# Patient Record
Sex: MALE | Race: WHITE | Employment: FULL TIME | URBAN - METROPOLITAN AREA
[De-identification: names, ages, dates, MRNs, and addresses within clinical notes are randomized per-mention and may not be internally consistent; named-entity substitution may affect disease eponyms.]

---

## 2017-07-02 ENCOUNTER — HOSPITAL ENCOUNTER (OUTPATIENT)
Age: 37
Discharge: HOME | End: 2017-07-02
Payer: OTHER MISCELLANEOUS

## 2017-07-02 ENCOUNTER — APPOINTMENT (OUTPATIENT)
Dept: GENERAL RADIOLOGY | Age: 37
End: 2017-07-02
Attending: PHYSICIAN ASSISTANT
Payer: OTHER MISCELLANEOUS

## 2017-07-02 VITALS
SYSTOLIC BLOOD PRESSURE: 131 MMHG | RESPIRATION RATE: 18 BRPM | HEART RATE: 66 BPM | OXYGEN SATURATION: 98 % | DIASTOLIC BLOOD PRESSURE: 93 MMHG | TEMPERATURE: 98 F

## 2017-07-02 DIAGNOSIS — S46.911A SHOULDER STRAIN, RIGHT, INITIAL ENCOUNTER: Primary | ICD-10-CM

## 2017-07-02 PROCEDURE — 73030 X-RAY EXAM OF SHOULDER: CPT | Performed by: PHYSICIAN ASSISTANT

## 2017-07-02 PROCEDURE — 99204 OFFICE O/P NEW MOD 45 MIN: CPT

## 2017-07-02 PROCEDURE — 99213 OFFICE O/P EST LOW 20 MIN: CPT

## 2017-07-02 RX ORDER — IBUPROFEN 600 MG/1
600 TABLET ORAL ONCE
Status: COMPLETED | OUTPATIENT
Start: 2017-07-02 | End: 2017-07-02
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
## 2017-07-02 NOTE — ED PROVIDER NOTES
Patient Seen in: [facility]    History   Patient presents with:   Worker's Comp  Upper Extremity Injury (musculoskeletal)    Stated Complaint: arm injury- Work Comp    HPI    42 yo male  here for evaluation after he Comment: Procedure: CERVICAL EPIDURAL;  Surgeon:                Lia Shannon MD;  Location: [place] [address] MANAGEMENT  5/29/2012: Beulah OHARA & Gina Karimi NDL/CATH SPI DX/THER ZKV      Comment: Procedure: CERVICAL EPIDURAL;  Surgeon: 4/16/2012: INJECTION, W/WO CONTRAST, DX/THERAPEUTIC SUBST*      Comment: Procedure: CERVICAL EPIDURAL;  Surgeon:                Tata Veras MD;  Location: [place] [address] MANAGEMENT  5/8/2012: INJECTION, W/WO CONTRAST, DX/THERAPEUTIC Dalia Eagle MD;  Location: [place] [address] MANAGEMENT  6/27/2016: INJECTION, W/WO CONTRAST, DX/THERAPEUTIC SUBST* N/A      Comment: Procedure: LUMBAR EPIDURAL;  Surgeon:                Daila Eagle MD;  Location: [facility] Smoking status: Never Smoker                                                              Smokeless tobacco: Never Used                      Alcohol use: Yes           7.2 oz/week     Cans of beer: 12 per week     Comment: Social, weekly    The patient's m Result Date: 7/2/2017  PROCEDURE:  XR SHOULDER, COMPLETE (MIN 2 VIEWS), RIGHT (CPT=73030)     TECHNIQUE:  Multiple views were obtained. COMPARISON:  None.   INDICATIONS:  arm injury- Work Comp  PATIENT STATED HISTORY: (As transcribed by Technologist)  Wo Schedule an appointment as soon as possible for a visit  If symptoms worsen or with your own Ortho      Medications Prescribed:  Current Discharge Medication List

## 2017-07-31 PROBLEM — R79.89 ELEVATED TSH: Status: ACTIVE | Noted: 2017-07-31

## 2017-07-31 PROBLEM — E10.65 UNCONTROLLED TYPE 1 DIABETES MELLITUS WITH HYPERGLYCEMIA (HCC): Status: ACTIVE | Noted: 2017-07-31

## 2017-08-28 PROCEDURE — 82570 ASSAY OF URINE CREATININE: CPT | Performed by: INTERNAL MEDICINE

## 2017-08-28 PROCEDURE — 82043 UR ALBUMIN QUANTITATIVE: CPT | Performed by: INTERNAL MEDICINE

## 2017-08-28 PROCEDURE — 81003 URINALYSIS AUTO W/O SCOPE: CPT | Performed by: INTERNAL MEDICINE

## 2017-11-02 PROBLEM — M75.21 RIGHT BICIPITAL TENOSYNOVITIS: Status: ACTIVE | Noted: 2017-11-02

## 2017-11-07 PROBLEM — G89.29 CHRONIC LEFT SHOULDER PAIN: Status: ACTIVE | Noted: 2017-11-07

## 2017-11-07 PROBLEM — M25.512 CHRONIC LEFT SHOULDER PAIN: Status: ACTIVE | Noted: 2017-11-07

## 2017-12-05 PROCEDURE — 81003 URINALYSIS AUTO W/O SCOPE: CPT | Performed by: INTERNAL MEDICINE

## 2018-03-13 PROCEDURE — 81003 URINALYSIS AUTO W/O SCOPE: CPT | Performed by: INTERNAL MEDICINE

## 2018-04-02 PROBLEM — M47.22 OSTEOARTHRITIS OF SPINE WITH RADICULOPATHY, CERVICAL REGION: Status: ACTIVE | Noted: 2018-04-02

## 2018-04-02 PROBLEM — M48.02 CERVICAL STENOSIS OF SPINAL CANAL: Status: ACTIVE | Noted: 2018-04-02

## 2018-07-25 PROBLEM — G47.33 OSA (OBSTRUCTIVE SLEEP APNEA): Status: ACTIVE | Noted: 2018-07-25

## 2018-07-25 PROBLEM — G47.33 OSA (OBSTRUCTIVE SLEEP APNEA): Status: ACTIVE | Noted: 2018-02-16

## 2018-08-14 PROBLEM — M48.02 FORAMINAL STENOSIS OF CERVICAL REGION: Status: ACTIVE | Noted: 2018-08-14

## 2018-08-14 PROBLEM — Z98.1 HISTORY OF FUSION OF CERVICAL SPINE: Status: ACTIVE | Noted: 2018-08-14

## 2018-10-04 PROCEDURE — 87389 HIV-1 AG W/HIV-1&-2 AB AG IA: CPT | Performed by: INTERNAL MEDICINE

## 2018-10-04 PROCEDURE — 87491 CHLMYD TRACH DNA AMP PROBE: CPT | Performed by: INTERNAL MEDICINE

## 2018-10-04 PROCEDURE — 86706 HEP B SURFACE ANTIBODY: CPT | Performed by: INTERNAL MEDICINE

## 2018-10-04 PROCEDURE — 86780 TREPONEMA PALLIDUM: CPT | Performed by: INTERNAL MEDICINE

## 2018-10-04 PROCEDURE — 36415 COLL VENOUS BLD VENIPUNCTURE: CPT | Performed by: INTERNAL MEDICINE

## 2018-10-04 PROCEDURE — 87591 N.GONORRHOEAE DNA AMP PROB: CPT | Performed by: INTERNAL MEDICINE

## 2018-12-05 PROBLEM — M54.12 CERVICAL RADICULITIS: Status: ACTIVE | Noted: 2018-12-05

## 2019-02-07 PROCEDURE — 82043 UR ALBUMIN QUANTITATIVE: CPT | Performed by: INTERNAL MEDICINE

## 2019-02-07 PROCEDURE — 82570 ASSAY OF URINE CREATININE: CPT | Performed by: INTERNAL MEDICINE

## 2019-02-19 PROBLEM — M25.521 RIGHT ELBOW PAIN: Status: ACTIVE | Noted: 2019-02-19

## 2019-07-26 PROBLEM — M75.21 RIGHT BICIPITAL TENOSYNOVITIS: Status: RESOLVED | Noted: 2017-11-02 | Resolved: 2019-07-26

## 2019-07-26 PROBLEM — M25.521 RIGHT ELBOW PAIN: Status: RESOLVED | Noted: 2019-02-19 | Resolved: 2019-07-26

## 2019-07-26 PROBLEM — M48.02 FORAMINAL STENOSIS OF CERVICAL REGION: Status: RESOLVED | Noted: 2018-08-14 | Resolved: 2019-07-26

## 2021-02-27 ENCOUNTER — APPOINTMENT (OUTPATIENT)
Dept: ULTRASOUND IMAGING | Age: 41
End: 2021-02-27
Attending: EMERGENCY MEDICINE
Payer: COMMERCIAL

## 2021-02-27 ENCOUNTER — HOSPITAL ENCOUNTER (EMERGENCY)
Age: 41
Discharge: HOME | End: 2021-02-27
Attending: EMERGENCY MEDICINE
Payer: COMMERCIAL

## 2021-02-27 ENCOUNTER — APPOINTMENT (OUTPATIENT)
Dept: ULTRASOUND IMAGING | Facility: HOSPITAL | Age: 41
End: 2021-02-27
Attending: EMERGENCY MEDICINE
Payer: COMMERCIAL

## 2021-02-27 ENCOUNTER — APPOINTMENT (OUTPATIENT)
Dept: GENERAL RADIOLOGY | Age: 41
End: 2021-02-27
Attending: EMERGENCY MEDICINE
Payer: COMMERCIAL

## 2021-02-27 VITALS
RESPIRATION RATE: 16 BRPM | OXYGEN SATURATION: 97 % | HEART RATE: 89 BPM | SYSTOLIC BLOOD PRESSURE: 138 MMHG | DIASTOLIC BLOOD PRESSURE: 79 MMHG | WEIGHT: 250 LBS | BODY MASS INDEX: 32 KG/M2

## 2021-02-27 DIAGNOSIS — R10.11 ABDOMINAL PAIN, RIGHT UPPER QUADRANT: Primary | ICD-10-CM

## 2021-02-27 DIAGNOSIS — K58.9 IRRITABLE BOWEL SYNDROME, UNSPECIFIED TYPE: ICD-10-CM

## 2021-02-27 LAB
ALBUMIN SERPL-MCNC: 4 G/DL (ref 3.4–5)
ALBUMIN/GLOB SERPL: 1.3 {RATIO} (ref 1–2)
ALP LIVER SERPL-CCNC: 99 U/L
ALT SERPL-CCNC: 84 U/L
ANION GAP SERPL CALC-SCNC: 7 MMOL/L (ref 0–18)
AST SERPL-CCNC: 40 U/L (ref 15–37)
BASOPHILS # BLD AUTO: 0.03 X10(3) UL (ref 0–0.2)
BASOPHILS NFR BLD AUTO: 0.5 %
BILIRUB SERPL-MCNC: 0.8 MG/DL (ref 0.1–2)
BILIRUB UR QL STRIP.AUTO: NEGATIVE
BUN BLD-MCNC: 16 MG/DL (ref 7–18)
BUN/CREAT SERPL: 13.6 (ref 10–20)
CALCIUM BLD-MCNC: 9.8 MG/DL (ref 8.5–10.1)
CHLORIDE SERPL-SCNC: 101 MMOL/L (ref 98–112)
CLARITY UR REFRACT.AUTO: CLEAR
CO2 SERPL-SCNC: 27 MMOL/L (ref 21–32)
COLOR UR AUTO: YELLOW
CREAT BLD-MCNC: 1.18 MG/DL
DEPRECATED RDW RBC AUTO: 39.6 FL (ref 35.1–46.3)
EOSINOPHIL # BLD AUTO: 0.04 X10(3) UL (ref 0–0.7)
EOSINOPHIL NFR BLD AUTO: 0.6 %
ERYTHROCYTE [DISTWIDTH] IN BLOOD BY AUTOMATED COUNT: 11.9 % (ref 11–15)
GLOBULIN PLAS-MCNC: 3.2 G/DL (ref 2.8–4.4)
GLUCOSE BLD-MCNC: 264 MG/DL (ref 70–99)
GLUCOSE UR STRIP.AUTO-MCNC: >=1000 MG/DL
HCT VFR BLD AUTO: 44.6 %
HGB BLD-MCNC: 15.5 G/DL
IMM GRANULOCYTES # BLD AUTO: 0.01 X10(3) UL (ref 0–1)
IMM GRANULOCYTES NFR BLD: 0.2 %
KETONES UR STRIP.AUTO-MCNC: 15 MG/DL
LEUKOCYTE ESTERASE UR QL STRIP.AUTO: NEGATIVE
LIPASE SERPL-CCNC: 67 U/L (ref 73–393)
LYMPHOCYTES # BLD AUTO: 1.26 X10(3) UL (ref 1–4)
LYMPHOCYTES NFR BLD AUTO: 19 %
M PROTEIN MFR SERPL ELPH: 7.2 G/DL (ref 6.4–8.2)
MCH RBC QN AUTO: 31.6 PG (ref 26–34)
MCHC RBC AUTO-ENTMCNC: 34.8 G/DL (ref 31–37)
MCV RBC AUTO: 90.8 FL
MONOCYTES # BLD AUTO: 0.48 X10(3) UL (ref 0.1–1)
MONOCYTES NFR BLD AUTO: 7.2 %
NEUTROPHILS # BLD AUTO: 4.81 X10 (3) UL (ref 1.5–7.7)
NEUTROPHILS # BLD AUTO: 4.81 X10(3) UL (ref 1.5–7.7)
NEUTROPHILS NFR BLD AUTO: 72.5 %
NITRITE UR QL STRIP.AUTO: NEGATIVE
OSMOLALITY SERPL CALC.SUM OF ELEC: 290 MOSM/KG (ref 275–295)
PH UR STRIP.AUTO: 6 [PH] (ref 5–8)
PLATELET # BLD AUTO: 264 10(3)UL (ref 150–450)
POTASSIUM SERPL-SCNC: 4 MMOL/L (ref 3.5–5.1)
PROT UR STRIP.AUTO-MCNC: NEGATIVE MG/DL
RBC # BLD AUTO: 4.91 X10(6)UL
RBC UR QL AUTO: NEGATIVE
SODIUM SERPL-SCNC: 135 MMOL/L (ref 136–145)
SP GR UR STRIP.AUTO: 1.02 (ref 1–1.03)
UROBILINOGEN UR STRIP.AUTO-MCNC: 0.2 MG/DL
WBC # BLD AUTO: 6.6 X10(3) UL (ref 4–11)

## 2021-02-27 PROCEDURE — 74019 RADEX ABDOMEN 2 VIEWS: CPT | Performed by: EMERGENCY MEDICINE

## 2021-02-27 PROCEDURE — 85025 COMPLETE CBC W/AUTO DIFF WBC: CPT | Performed by: EMERGENCY MEDICINE

## 2021-02-27 PROCEDURE — 36415 COLL VENOUS BLD VENIPUNCTURE: CPT

## 2021-02-27 PROCEDURE — 99284 EMERGENCY DEPT VISIT MOD MDM: CPT

## 2021-02-27 PROCEDURE — 81003 URINALYSIS AUTO W/O SCOPE: CPT | Performed by: EMERGENCY MEDICINE

## 2021-02-27 PROCEDURE — 83690 ASSAY OF LIPASE: CPT | Performed by: EMERGENCY MEDICINE

## 2021-02-27 PROCEDURE — 76700 US EXAM ABDOM COMPLETE: CPT | Performed by: EMERGENCY MEDICINE

## 2021-02-27 PROCEDURE — 71046 X-RAY EXAM CHEST 2 VIEWS: CPT | Performed by: EMERGENCY MEDICINE

## 2021-02-27 PROCEDURE — 80053 COMPREHEN METABOLIC PANEL: CPT | Performed by: EMERGENCY MEDICINE

## 2021-02-27 RX ORDER — DICYCLOMINE HCL 20 MG
20 TABLET ORAL 4 TIMES DAILY PRN
Qty: 30 TABLET | Refills: 0 | Status: SHIPPED | OUTPATIENT
Start: 2021-02-27 | End: 2021-03-29

## 2021-02-27 RX ORDER — METOCLOPRAMIDE 10 MG/1
10 TABLET ORAL 3 TIMES DAILY PRN
Qty: 20 TABLET | Refills: 0 | Status: SHIPPED | OUTPATIENT
Start: 2021-02-27 | End: 2021-03-22

## 2021-02-27 NOTE — ED PROVIDER NOTES
Patient Seen in: [facility] In [place]      History   Patient presents with:  Abdomen/Flank Pain    Stated Complaint: abd pain    HPI/Subjective:   HPI    78-year-old male presents to the emerge department with right upper quadrant pa Performed by Melissa Yu MD at [address] N/A 1/6/2014    Performed by Jessica Ratliff MD at [address]   • CERVICAL EPIDURAL  12/12/2013    Performed by Brennen Van MD at [facility] ([facility]) F HPI.  Constitutional and vital signs reviewed. All other systems reviewed and negative except as noted above.     Physical Exam     ED Triage Vitals [02/27/21 1531]   /88   Pulse 104   Resp 16   Temp    Temp src    SpO2 94 %   O2 Device None [employer] Lipase 67 (*)     All other components within normal limits   CBC WITH DIFFERENTIAL WITH PLATELET    Narrative: The following orders were created for panel order CBC WITH DIFFERENTIAL WITH PLATELET.   Procedure                               Abnormali measures 4 mm. No intrahepatic biliary dilatation. Gallbladder: The gallbladder is visualized. There is no evidence of cholelithiasis or sludge present. There is no gall bladder wall thickening. The gallbladder wall measures 2 mm.  No Ochoa's sign was kay made him aware. His ultrasound showed no evidence of gallstones he had an incidental fatty liver and we did review those results and ultimate treatment of this as well. I personally reviewed his obstructive series and his chest x-ray.   There is a nonspec

## 2021-05-24 PROCEDURE — 88305 TISSUE EXAM BY PATHOLOGIST: CPT | Performed by: INTERNAL MEDICINE

## 2022-02-21 PROBLEM — E66.9 OBESITY (BMI 30-39.9): Status: ACTIVE | Noted: 2022-02-21

## 2022-05-31 ENCOUNTER — OCC HEALTH (OUTPATIENT)
Dept: OCCUPATIONAL MEDICINE | Age: 42
End: 2022-05-31
Attending: PHYSICIAN ASSISTANT

## 2022-05-31 ENCOUNTER — HOSPITAL ENCOUNTER (OUTPATIENT)
Dept: GENERAL RADIOLOGY | Age: 42
Discharge: HOME | End: 2022-05-31
Attending: PHYSICIAN ASSISTANT

## 2022-05-31 DIAGNOSIS — S46.912A LEFT SHOULDER STRAIN, INITIAL ENCOUNTER: ICD-10-CM

## 2022-05-31 DIAGNOSIS — S46.912A LEFT SHOULDER STRAIN, INITIAL ENCOUNTER: Primary | ICD-10-CM

## 2022-05-31 PROCEDURE — 73030 X-RAY EXAM OF SHOULDER: CPT | Performed by: PHYSICIAN ASSISTANT

## 2022-06-13 ENCOUNTER — OFFICE VISIT (OUTPATIENT)
Dept: OCCUPATIONAL MEDICINE | Age: 42
End: 2022-06-13
Attending: PHYSICIAN ASSISTANT

## 2022-06-13 DIAGNOSIS — S46.912D LEFT SHOULDER STRAIN, SUBSEQUENT ENCOUNTER: Primary | ICD-10-CM

## 2022-06-27 ENCOUNTER — OFFICE VISIT (OUTPATIENT)
Dept: OCCUPATIONAL MEDICINE | Age: 42
End: 2022-06-27
Attending: PHYSICIAN ASSISTANT

## 2022-06-27 DIAGNOSIS — M24.812 INTERNAL DERANGEMENT OF LEFT SHOULDER: Primary | ICD-10-CM

## 2022-07-27 ENCOUNTER — OFFICE VISIT (OUTPATIENT)
Dept: OCCUPATIONAL MEDICINE | Age: 42
End: 2022-07-27
Attending: FAMILY MEDICINE

## 2022-08-05 ENCOUNTER — HOSPITAL ENCOUNTER (EMERGENCY)
Age: 42
Discharge: HOME | End: 2022-08-05
Attending: EMERGENCY MEDICINE
Payer: COMMERCIAL

## 2022-08-05 ENCOUNTER — APPOINTMENT (OUTPATIENT)
Dept: GENERAL RADIOLOGY | Age: 42
End: 2022-08-05
Attending: EMERGENCY MEDICINE
Payer: COMMERCIAL

## 2022-08-05 VITALS
WEIGHT: 240 LBS | SYSTOLIC BLOOD PRESSURE: 133 MMHG | RESPIRATION RATE: 18 BRPM | HEART RATE: 103 BPM | HEIGHT: 74 IN | BODY MASS INDEX: 30.8 KG/M2 | OXYGEN SATURATION: 100 % | DIASTOLIC BLOOD PRESSURE: 72 MMHG

## 2022-08-05 DIAGNOSIS — E86.0 DEHYDRATION: ICD-10-CM

## 2022-08-05 DIAGNOSIS — N17.9 AKI (ACUTE KIDNEY INJURY) (HCC): ICD-10-CM

## 2022-08-05 DIAGNOSIS — R73.9 HYPERGLYCEMIA: Primary | ICD-10-CM

## 2022-08-05 LAB
ALBUMIN SERPL-MCNC: 4.1 G/DL (ref 3.4–5)
ALBUMIN/GLOB SERPL: 1.3 {RATIO} (ref 1–2)
ALP LIVER SERPL-CCNC: 105 U/L
ALT SERPL-CCNC: 80 U/L
ANION GAP SERPL CALC-SCNC: 14 MMOL/L (ref 0–18)
AST SERPL-CCNC: 39 U/L (ref 15–37)
ATRIAL RATE: 115 BPM
BASE EXCESS BLD CALC-SCNC: -5 MMOL/L
BASOPHILS # BLD AUTO: 0.04 X10(3) UL (ref 0–0.2)
BASOPHILS NFR BLD AUTO: 0.4 %
BILIRUB SERPL-MCNC: 1.8 MG/DL (ref 0.1–2)
BILIRUB UR QL STRIP.AUTO: NEGATIVE
BUN BLD-MCNC: 23 MG/DL (ref 7–18)
CALCIUM BLD-MCNC: 9.9 MG/DL (ref 8.5–10.1)
CHLORIDE SERPL-SCNC: 91 MMOL/L (ref 98–112)
CLARITY UR REFRACT.AUTO: CLEAR
CO2 BLD-SCNC: 21 MMOL/L (ref 22–32)
CO2 SERPL-SCNC: 20 MMOL/L (ref 21–32)
COLOR UR AUTO: YELLOW
CREAT BLD-MCNC: 1.38 MG/DL
EOSINOPHIL # BLD AUTO: 0.01 X10(3) UL (ref 0–0.7)
EOSINOPHIL NFR BLD AUTO: 0.1 %
ERYTHROCYTE [DISTWIDTH] IN BLOOD BY AUTOMATED COUNT: 12.1 %
EST. AVERAGE GLUCOSE BLD GHB EST-MCNC: 214 MG/DL (ref 68–126)
GFR SERPLBLD BASED ON 1.73 SQ M-ARVRAT: 65 ML/MIN/1.73M2 (ref 60–?)
GLOBULIN PLAS-MCNC: 3.2 G/DL (ref 2.8–4.4)
GLUCOSE BLD-MCNC: 353 MG/DL (ref 70–99)
GLUCOSE BLD-MCNC: 424 MG/DL (ref 70–99)
GLUCOSE BLD-MCNC: 488 MG/DL (ref 70–99)
GLUCOSE BLD-MCNC: 547 MG/DL (ref 70–99)
GLUCOSE BLD-MCNC: 548 MG/DL (ref 70–99)
GLUCOSE UR STRIP.AUTO-MCNC: >=1000 MG/DL
HBA1C MFR BLD: 9.1 % (ref ?–5.7)
HCO3 BLD-SCNC: 20 MEQ/L
HCT VFR BLD AUTO: 45.5 %
HGB BLD-MCNC: 16 G/DL
IMM GRANULOCYTES # BLD AUTO: 0.04 X10(3) UL (ref 0–1)
IMM GRANULOCYTES NFR BLD: 0.4 %
KETONES UR STRIP.AUTO-MCNC: >=160 MG/DL
LEUKOCYTE ESTERASE UR QL STRIP.AUTO: NEGATIVE
LYMPHOCYTES # BLD AUTO: 1.17 X10(3) UL (ref 1–4)
LYMPHOCYTES NFR BLD AUTO: 10.8 %
MCH RBC QN AUTO: 32.4 PG (ref 26–34)
MCHC RBC AUTO-ENTMCNC: 35.2 G/DL (ref 31–37)
MCV RBC AUTO: 92.1 FL
MONOCYTES # BLD AUTO: 0.71 X10(3) UL (ref 0.1–1)
MONOCYTES NFR BLD AUTO: 6.5 %
NEUTROPHILS # BLD AUTO: 8.91 X10 (3) UL (ref 1.5–7.7)
NEUTROPHILS # BLD AUTO: 8.91 X10(3) UL (ref 1.5–7.7)
NEUTROPHILS NFR BLD AUTO: 81.8 %
NITRITE UR QL STRIP.AUTO: NEGATIVE
OSMOLALITY SERPL CALC.SUM OF ELEC: 289 MOSM/KG (ref 275–295)
P AXIS: 69 DEGREES
P-R INTERVAL: 132 MS
PCO2 BLD: 35.5 MMHG
PH BLD: 7.36 [PH]
PH UR STRIP.AUTO: 5 [PH] (ref 5–8)
PLATELET # BLD AUTO: 235 10(3)UL (ref 150–450)
PO2 BLD: 69 MMHG
POTASSIUM SERPL-SCNC: 4.8 MMOL/L (ref 3.5–5.1)
PROT SERPL-MCNC: 7.3 G/DL (ref 6.4–8.2)
PROT UR STRIP.AUTO-MCNC: NEGATIVE MG/DL
Q-T INTERVAL: 322 MS
QRS DURATION: 82 MS
QTC CALCULATION (BEZET): 445 MS
R AXIS: 33 DEGREES
RBC # BLD AUTO: 4.94 X10(6)UL
RBC UR QL AUTO: NEGATIVE
SAO2 % BLD: 93 %
SARS-COV-2 RNA RESP QL NAA+PROBE: NOT DETECTED
SODIUM SERPL-SCNC: 125 MMOL/L (ref 136–145)
SP GR UR STRIP.AUTO: <=1.005 (ref 1–1.03)
T AXIS: 52 DEGREES
TROPONIN I HIGH SENSITIVITY: 7 NG/L
UROBILINOGEN UR STRIP.AUTO-MCNC: 0.2 MG/DL
VENTRICULAR RATE: 115 BPM
WBC # BLD AUTO: 10.9 X10(3) UL (ref 4–11)

## 2022-08-05 PROCEDURE — 99285 EMERGENCY DEPT VISIT HI MDM: CPT

## 2022-08-05 PROCEDURE — 80053 COMPREHEN METABOLIC PANEL: CPT | Performed by: EMERGENCY MEDICINE

## 2022-08-05 PROCEDURE — 93005 ELECTROCARDIOGRAM TRACING: CPT

## 2022-08-05 PROCEDURE — 82962 GLUCOSE BLOOD TEST: CPT

## 2022-08-05 PROCEDURE — 84484 ASSAY OF TROPONIN QUANT: CPT | Performed by: EMERGENCY MEDICINE

## 2022-08-05 PROCEDURE — 81003 URINALYSIS AUTO W/O SCOPE: CPT | Performed by: EMERGENCY MEDICINE

## 2022-08-05 PROCEDURE — 93010 ELECTROCARDIOGRAM REPORT: CPT

## 2022-08-05 PROCEDURE — 82803 BLOOD GASES ANY COMBINATION: CPT

## 2022-08-05 PROCEDURE — 85025 COMPLETE CBC W/AUTO DIFF WBC: CPT | Performed by: EMERGENCY MEDICINE

## 2022-08-05 PROCEDURE — 83036 HEMOGLOBIN GLYCOSYLATED A1C: CPT | Performed by: EMERGENCY MEDICINE

## 2022-08-05 PROCEDURE — 71045 X-RAY EXAM CHEST 1 VIEW: CPT | Performed by: EMERGENCY MEDICINE

## 2022-08-05 PROCEDURE — 96361 HYDRATE IV INFUSION ADD-ON: CPT

## 2022-08-05 PROCEDURE — 96360 HYDRATION IV INFUSION INIT: CPT

## 2022-08-05 PROCEDURE — 82009 KETONE BODYS QUAL: CPT | Performed by: EMERGENCY MEDICINE

## 2022-08-05 RX ORDER — INSULIN ASPART 100 [IU]/ML
0.2 INJECTION, SOLUTION INTRAVENOUS; SUBCUTANEOUS ONCE
Status: COMPLETED | OUTPATIENT
Start: 2022-08-05 | End: 2022-08-05

## 2022-08-05 RX ORDER — INSULIN ASPART 100 [IU]/ML
0.2 INJECTION, SOLUTION INTRAVENOUS; SUBCUTANEOUS ONCE
Status: DISCONTINUED | OUTPATIENT
Start: 2022-08-05 | End: 2022-08-05

## 2022-08-05 NOTE — ED INITIAL ASSESSMENT (HPI)
Pt reports high blood blood sugars in 200's for the past 2 wks then yesterday sugars in 500's, pt states he had a bad pump site that he changed, sugars went down to 150 then back up to 580 this am.  Pt states he did not change pump site again this am and has only bolused with pump. Pt also reports feeling heart racing and slightly SOB.

## 2025-08-26 ENCOUNTER — HOSPITAL ENCOUNTER (EMERGENCY)
Age: 45
Discharge: HOME | End: 2025-08-27
Attending: EMERGENCY MEDICINE

## 2025-08-26 DIAGNOSIS — G43.909 MIGRAINE WITHOUT STATUS MIGRAINOSUS, NOT INTRACTABLE, UNSPECIFIED MIGRAINE TYPE: Primary | ICD-10-CM

## 2025-08-26 PROCEDURE — 85025 COMPLETE CBC W/AUTO DIFF WBC: CPT | Performed by: EMERGENCY MEDICINE

## 2025-08-26 PROCEDURE — 99284 EMERGENCY DEPT VISIT MOD MDM: CPT

## 2025-08-26 PROCEDURE — 96374 THER/PROPH/DIAG INJ IV PUSH: CPT

## 2025-08-26 PROCEDURE — 85652 RBC SED RATE AUTOMATED: CPT | Performed by: EMERGENCY MEDICINE

## 2025-08-26 PROCEDURE — 99285 EMERGENCY DEPT VISIT HI MDM: CPT

## 2025-08-26 PROCEDURE — 96375 TX/PRO/DX INJ NEW DRUG ADDON: CPT

## 2025-08-26 PROCEDURE — 80053 COMPREHEN METABOLIC PANEL: CPT | Performed by: EMERGENCY MEDICINE

## 2025-08-26 RX ORDER — DEXAMETHASONE SODIUM PHOSPHATE 4 MG/ML
10 VIAL (ML) INJECTION ONCE
Status: COMPLETED | OUTPATIENT
Start: 2025-08-26 | End: 2025-08-26

## 2025-08-26 RX ORDER — KETOROLAC TROMETHAMINE 15 MG/ML
15 INJECTION, SOLUTION INTRAMUSCULAR; INTRAVENOUS ONCE
Status: COMPLETED | OUTPATIENT
Start: 2025-08-26 | End: 2025-08-26

## 2025-08-27 ENCOUNTER — APPOINTMENT (OUTPATIENT)
Dept: CT IMAGING | Age: 45
End: 2025-08-27
Attending: EMERGENCY MEDICINE

## 2025-08-27 VITALS
WEIGHT: 235 LBS | RESPIRATION RATE: 16 BRPM | SYSTOLIC BLOOD PRESSURE: 141 MMHG | HEIGHT: 74 IN | OXYGEN SATURATION: 95 % | TEMPERATURE: 98 F | BODY MASS INDEX: 30.16 KG/M2 | DIASTOLIC BLOOD PRESSURE: 85 MMHG | HEART RATE: 68 BPM

## 2025-08-27 LAB
ALBUMIN SERPL-MCNC: 4.3 G/DL (ref 3.2–4.8)
ALBUMIN/GLOB SERPL: 2.4 (ref 1–2)
ALP LIVER SERPL-CCNC: 74 U/L (ref 45–117)
ALT SERPL-CCNC: 128 U/L (ref 10–49)
ANION GAP SERPL CALC-SCNC: 7 MMOL/L (ref 0–18)
AST SERPL-CCNC: 81 U/L (ref ?–34)
BASOPHILS # BLD AUTO: 0.04 X10(3) UL (ref 0–0.2)
BASOPHILS NFR BLD AUTO: 0.7 %
BILIRUB SERPL-MCNC: 0.5 MG/DL (ref 0.3–1.2)
BUN BLD-MCNC: 13 MG/DL (ref 9–23)
CALCIUM BLD-MCNC: 9.7 MG/DL (ref 8.7–10.6)
CHLORIDE SERPL-SCNC: 101 MMOL/L (ref 98–112)
CO2 SERPL-SCNC: 31 MMOL/L (ref 21–32)
CREAT BLD-MCNC: 1.23 MG/DL (ref 0.7–1.3)
EGFRCR SERPLBLD CKD-EPI 2021: 73 ML/MIN/1.73M2 (ref 60–?)
EOSINOPHIL # BLD AUTO: 0.14 X10(3) UL (ref 0–0.7)
EOSINOPHIL NFR BLD AUTO: 2.4 %
ERYTHROCYTE [DISTWIDTH] IN BLOOD BY AUTOMATED COUNT: 11.9 %
ERYTHROCYTE [SEDIMENTATION RATE] IN BLOOD: 3 MM/HR (ref 0–15)
GLOBULIN PLAS-MCNC: 1.8 G/DL (ref 2–3.5)
GLUCOSE BLD-MCNC: 106 MG/DL (ref 70–99)
HCT VFR BLD AUTO: 40.7 % (ref 39–53)
HGB BLD-MCNC: 14.5 G/DL (ref 13–17.5)
IMM GRANULOCYTES # BLD AUTO: 0.02 X10(3) UL (ref 0–1)
IMM GRANULOCYTES NFR BLD: 0.3 %
LYMPHOCYTES # BLD AUTO: 1.81 X10(3) UL (ref 1–4)
LYMPHOCYTES NFR BLD AUTO: 30.5 %
MCH RBC QN AUTO: 33.6 PG (ref 26–34)
MCHC RBC AUTO-ENTMCNC: 35.6 G/DL (ref 31–37)
MCV RBC AUTO: 94.2 FL (ref 80–100)
MONOCYTES # BLD AUTO: 0.5 X10(3) UL (ref 0.1–1)
MONOCYTES NFR BLD AUTO: 8.4 %
NEUTROPHILS # BLD AUTO: 3.42 X10 (3) UL (ref 1.5–7.7)
NEUTROPHILS # BLD AUTO: 3.42 X10(3) UL (ref 1.5–7.7)
NEUTROPHILS NFR BLD AUTO: 57.7 %
OSMOLALITY SERPL CALC.SUM OF ELEC: 289 MOSM/KG (ref 275–295)
PLATELET # BLD AUTO: 210 10(3)UL (ref 150–450)
POTASSIUM SERPL-SCNC: 3.6 MMOL/L (ref 3.5–5.1)
PROT SERPL-MCNC: 6.1 G/DL (ref 5.7–8.2)
RBC # BLD AUTO: 4.32 X10(6)UL (ref 4.3–5.7)
SODIUM SERPL-SCNC: 139 MMOL/L (ref 136–145)
WBC # BLD AUTO: 5.9 X10(3) UL (ref 4–11)

## 2025-08-27 PROCEDURE — 70450 CT HEAD/BRAIN W/O DYE: CPT | Performed by: EMERGENCY MEDICINE

## 2025-08-27 RX ORDER — BUTALBITAL, ACETAMINOPHEN AND CAFFEINE 50; 325; 40 MG/1; MG/1; MG/1
1-2 TABLET ORAL EVERY 4 HOURS PRN
Qty: 20 TABLET | Refills: 0 | Status: SHIPPED | OUTPATIENT
Start: 2025-08-27 | End: 2025-09-03

## 2025-08-27 RX ORDER — ONDANSETRON 4 MG/1
4 TABLET, ORALLY DISINTEGRATING ORAL EVERY 4 HOURS PRN
Qty: 10 TABLET | Refills: 0 | Status: SHIPPED | OUTPATIENT
Start: 2025-08-27 | End: 2025-09-03